# Patient Record
Sex: FEMALE | Race: WHITE
[De-identification: names, ages, dates, MRNs, and addresses within clinical notes are randomized per-mention and may not be internally consistent; named-entity substitution may affect disease eponyms.]

---

## 2019-01-20 ENCOUNTER — HOSPITAL ENCOUNTER (EMERGENCY)
Dept: HOSPITAL 54 - ER | Age: 39
Discharge: HOME | End: 2019-01-20
Payer: COMMERCIAL

## 2019-01-20 VITALS — HEIGHT: 69 IN | WEIGHT: 130 LBS | BODY MASS INDEX: 19.26 KG/M2

## 2019-01-20 VITALS — DIASTOLIC BLOOD PRESSURE: 92 MMHG | SYSTOLIC BLOOD PRESSURE: 130 MMHG

## 2019-01-20 DIAGNOSIS — S83.8X1A: Primary | ICD-10-CM

## 2019-01-20 DIAGNOSIS — G43.909: ICD-10-CM
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
## 2019-01-20 DIAGNOSIS — Z60.2: ICD-10-CM

## 2019-01-20 PROCEDURE — 96375 TX/PRO/DX INJ NEW DRUG ADDON: CPT

## 2019-01-20 PROCEDURE — 84703 CHORIONIC GONADOTROPIN ASSAY: CPT

## 2019-01-20 PROCEDURE — 99283 EMERGENCY DEPT VISIT LOW MDM: CPT

## 2019-01-20 PROCEDURE — A4216 STERILE WATER/SALINE, 10 ML: HCPCS

## 2019-01-20 PROCEDURE — 96374 THER/PROPH/DIAG INJ IV PUSH: CPT

## 2019-01-20 SDOH — SOCIAL STABILITY - SOCIAL INSECURITY: PROBLEMS RELATED TO LIVING ALONE: Z60.2

## 2019-01-20 NOTE — NUR
BIB SELF W C/O WORSENING "MIGRAINE HA FOR MONTHS AND MONTHS", NO RELIEF WITH 
IMITREX

ALSO C/O KNEE PAIN X 2 DAYS AGO WHEN SHE TWISTED IT. TO ER BED 3, HOOKED TO 
[facility], AWAITING MD PARMAR

## 2019-01-23 ENCOUNTER — HOSPITAL ENCOUNTER (EMERGENCY)
Dept: HOSPITAL 54 - ER | Age: 39
Discharge: HOME | End: 2019-01-23
Payer: COMMERCIAL

## 2019-01-23 VITALS — WEIGHT: 135 LBS | BODY MASS INDEX: 19.99 KG/M2 | HEIGHT: 69 IN

## 2019-01-23 VITALS — DIASTOLIC BLOOD PRESSURE: 84 MMHG | SYSTOLIC BLOOD PRESSURE: 140 MMHG
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
## 2019-01-23 DIAGNOSIS — J45.909: ICD-10-CM

## 2019-01-23 DIAGNOSIS — R51: ICD-10-CM

## 2019-01-23 DIAGNOSIS — F43.10: ICD-10-CM

## 2019-01-23 DIAGNOSIS — Z60.2: ICD-10-CM

## 2019-01-23 DIAGNOSIS — T58.8X1A: Primary | ICD-10-CM

## 2019-01-23 DIAGNOSIS — F41.9: ICD-10-CM

## 2019-01-23 SDOH — SOCIAL STABILITY - SOCIAL INSECURITY: PROBLEMS RELATED TO LIVING ALONE: Z60.2

## 2019-01-23 NOTE — NUR
C/O MIGRAINE X 5 MONTHS, D/T GAS LEAKS AT HER APARTMENT,"CARBON MONOXIDE" -SOB, 
+N/V.  STATES SHE HAS "STROKED OUT" WITH PREVIOUS EPISODES.  PT IS AOX4, AMB, 
VSS, RR EVEN AND UNLABORED.  PAIN LEVEL 4/10.  SKIN INTACT.  NO ACUTE DISTRESS 
NOTED.  READY FOR EVAL.

## 2019-01-23 NOTE — NUR
PT EXPERIENCED "VASOVAGAL" RESPONSE POST ABG AND MED ADMIN.  PLACED ON MONITOR, 
INSTRUCTED PT TO BEAR DOWN AND TAKE SLOW DEEP BREATHS.  STATES SHE FEELS 
BETTER.

## 2019-01-24 LAB
BASE EXCESS BLDA CALC-SCNC: 1.6 MMOL/L
DO-HGB MFR BLDA: 6.2 MMHG
INHALED O2 CONCENTRATION: 21 %
PCO2 TEMP ADJ BLDA: 40.1 MMHG (ref 35–45)
PH TEMP ADJ BLDA: 7.43 [PH] (ref 7.35–7.45)
PO2 TEMP ADJ BLDA: 95.5 MMHG (ref 75–100)
SAO2 % BLDA: 97 % (ref 92–98.5)
VENTILATION MODE VENT: (no result)